# Patient Record
Sex: FEMALE | Race: WHITE | URBAN - NONMETROPOLITAN AREA
[De-identification: names, ages, dates, MRNs, and addresses within clinical notes are randomized per-mention and may not be internally consistent; named-entity substitution may affect disease eponyms.]

---

## 2016-04-25 LAB — NORMAL RANGE: NORMAL

## 2017-05-09 ENCOUNTER — RADIANT APPOINTMENT (OUTPATIENT)
Dept: GENERAL RADIOLOGY | Facility: CLINIC | Age: 52
End: 2017-05-09
Attending: NURSE PRACTITIONER
Payer: OTHER MISCELLANEOUS

## 2017-05-09 ENCOUNTER — OFFICE VISIT (OUTPATIENT)
Dept: FAMILY MEDICINE | Facility: CLINIC | Age: 52
End: 2017-05-09
Payer: OTHER MISCELLANEOUS

## 2017-05-09 VITALS
DIASTOLIC BLOOD PRESSURE: 72 MMHG | RESPIRATION RATE: 18 BRPM | BODY MASS INDEX: 34.37 KG/M2 | HEART RATE: 65 BPM | TEMPERATURE: 97.4 F | HEIGHT: 67 IN | WEIGHT: 219 LBS | OXYGEN SATURATION: 99 % | SYSTOLIC BLOOD PRESSURE: 110 MMHG

## 2017-05-09 DIAGNOSIS — Z02.6 ENCOUNTER RELATED TO WORKER'S COMPENSATION CLAIM: Primary | ICD-10-CM

## 2017-05-09 DIAGNOSIS — Z02.6 ENCOUNTER RELATED TO WORKER'S COMPENSATION CLAIM: ICD-10-CM

## 2017-05-09 DIAGNOSIS — M62.830 SPASM OF BACK MUSCLES: ICD-10-CM

## 2017-05-09 DIAGNOSIS — M25.562 ACUTE PAIN OF LEFT KNEE: ICD-10-CM

## 2017-05-09 DIAGNOSIS — M25.552 HIP PAIN, LEFT: ICD-10-CM

## 2017-05-09 DIAGNOSIS — M54.42 ACUTE LEFT-SIDED LOW BACK PAIN WITH LEFT-SIDED SCIATICA: ICD-10-CM

## 2017-05-09 PROCEDURE — 73502 X-RAY EXAM HIP UNI 2-3 VIEWS: CPT

## 2017-05-09 PROCEDURE — 73560 X-RAY EXAM OF KNEE 1 OR 2: CPT | Mod: LT

## 2017-05-09 PROCEDURE — 72100 X-RAY EXAM L-S SPINE 2/3 VWS: CPT

## 2017-05-09 PROCEDURE — 99214 OFFICE O/P EST MOD 30 MIN: CPT | Performed by: NURSE PRACTITIONER

## 2017-05-09 RX ORDER — IBUPROFEN 800 MG/1
800 TABLET, FILM COATED ORAL EVERY 8 HOURS PRN
Qty: 60 TABLET | Refills: 1 | Status: SHIPPED | OUTPATIENT
Start: 2017-05-09 | End: 2017-06-23

## 2017-05-09 RX ORDER — CYCLOBENZAPRINE HCL 10 MG
5-10 TABLET ORAL 3 TIMES DAILY PRN
Qty: 30 TABLET | Refills: 1 | Status: SHIPPED | OUTPATIENT
Start: 2017-05-09 | End: 2017-06-23

## 2017-05-09 ASSESSMENT — PAIN SCALES - GENERAL: PAINLEVEL: SEVERE PAIN (7)

## 2017-05-09 NOTE — PATIENT INSTRUCTIONS
Use ice for 20 minutes several times a day    Ibuprofen and Tylenol can be rotated every 8 hours.    We will call you with the results of your X rays

## 2017-05-09 NOTE — Clinical Note
Please abstract the following data from this visit with this patient into the appropriate field in Epic:  Lipids found on Care Everywhere [facility] 1/5/2015

## 2017-05-09 NOTE — PROGRESS NOTES
"  SUBJECTIVE:                                                    Courtney Ellison is a 52 year old female who presents to clinic today for the following health issues:      Musculoskeletal problem/pain      Duration: since 4/11/2017    Description  Location: pain on whole left side of body and lower neck but concerned more about knee since previous Total Knee    Intensity:  moderate    Accompanying signs and symptoms: swelling    History  Previous similar problem: no   Previous evaluation:  Total left knee Arthroplasty 10/16/2012 Dr. Waller    Precipitating or alleviating factors:  Trauma or overuse: YES- fall at [employer] 4/11/2017, when trying to sit on stool the stool moved and she landed on floor and shelving when she went down  Aggravating factors include: standing, walking and climbing stairs    Therapies tried and outcome: acetaminophen and NSAID - IBU without any relief     Unable to move left leg to cross over right without pain.   Pain left sciatic radiates down left lateral leg to foot.   Internal and external rotation painful.   Had plate in back - bulging lumbar disc without myelopathy  Lumbar nerve root impingement    12/2015 had  L4-L5 and L5-S1 fuson with screw/gladis fixaton    Problem list and histories reviewed & adjusted, as indicated.  Additional history: as documented      Reviewed and updated as needed this visit by clinical staff  Allergies  Med Hx  Surg Hx  Fam Hx  Soc Hx      Reviewed and updated as needed this visit by Provider         ROS:  Constitutional, HEENT, cardiovascular, pulmonary, GI, , musculoskeletal, neuro, skin, endocrine and psych systems are negative, except as otherwise noted.    OBJECTIVE:                                                    /72 (BP Location: Left arm, Patient Position: Chair, Cuff Size: Adult Large)  Pulse 65  Temp 97.4  F (36.3  C) (Tympanic)  Resp 18  Ht 5' 7\" (1.702 m)  Wt 219 lb (99.3 kg)  SpO2 99%  Breastfeeding? No  BMI 34.3 kg/m2  Body mass " index is 34.3 kg/(m^2).  GENERAL: healthy, alert and no distress  NECK: no adenopathy, no asymmetry, masses, or scars and thyroid normal to palpation  RESP: lungs clear to auscultation - no rales, rhonchi or wheezes  CV: regular rate and rhythm, normal S1 S2, no S3 or S4, no murmur, click or rub, no peripheral edema and peripheral pulses strong  MS: tenderness to palpation lumbar paraspinal region, unable to cross left leg over right without pain and spine exam shows ROM is normal    Diagnostic Test Results:  Results for orders placed or performed in visit on 05/09/17   Colonoscopy - HIM Scan    Impression    Colonoscopy per Care Everywhere [facility] - sigmoid polyp    Mammogram - HIM Scan   Result Value Ref Range    Normal Range      Impression    Mammogram done at [facility] found on Care Everywhere 4/25/2016 negative        ASSESSMENT/PLAN:                                                      1. Encounter related to worker's compensation claim  Injury occurred approximately one month ago, pain has not resolved.  - XR Knee Standing Left 2 Views; Future  - XR Lumbar Spine 2/3 Views; Future  - XR Pelvis w Hip Left 1 View; Future    2. Hip pain, left  - XR Pelvis w Hip Left 1 View; Future  - ibuprofen (ADVIL/MOTRIN) 800 MG tablet; Take 1 tablet (800 mg) by mouth every 8 hours as needed for moderate pain  Dispense: 60 tablet; Refill: 1    3. Acute left-sided low back pain with left-sided sciatica  - XR Lumbar Spine 2/3 Views; Future  - ibuprofen (ADVIL/MOTRIN) 800 MG tablet; Take 1 tablet (800 mg) by mouth every 8 hours as needed for moderate pain  Dispense: 60 tablet; Refill: 1    4. Acute pain of left knee  - XR Knee Standing Left 2 Views; Future    5. Spasm of back muscles  - cyclobenzaprine (FLEXERIL) 10 MG tablet; Take 0.5-1 tablets (5-10 mg) by mouth 3 times daily as needed for muscle spasms  Dispense: 30 tablet; Refill: 1    Patient Instructions   Use ice for 20 minutes several times a day    Ibuprofen and Tylenol can  be rotated every 8 hours.    We will call you with the results of your X rays        Margaret Parrish NP, APRN [facility]

## 2017-05-09 NOTE — NURSING NOTE
"Chief Complaint   Patient presents with     Work Comp     Knee Injury       Initial There were no vitals taken for this visit. Estimated body mass index is 30.55 kg/(m^2) as calculated from the following:    Height as of 5/27/15: 5' 7.5\" (1.715 m).    Weight as of 5/27/15: 198 lb (89.8 kg).  Medication Reconciliation: complete  "

## 2017-05-09 NOTE — MR AVS SNAPSHOT
"              After Visit Summary   5/9/2017    Courtney Ellison    MRN: [id number]           Patient Information     Date Of Birth          1965        Visit Information        Provider Department      5/9/2017 9:20 AM Margaret Parrish APRN CNP [facility]        Today's Diagnoses     Encounter related to worker's compensation claim    -  1    Hip pain, left        Acute left-sided low back pain with left-sided sciatica        Acute pain of left knee        Spasm of back muscles          Care Instructions    Use ice for 20 minutes several times a day    Ibuprofen and Tylenol can be rotated every 8 hours.    We will call you with the results of your X rays            Follow-ups after your visit        Who to contact     If you have questions or need follow up information about today's clinic visit or your schedule please contact [facility] directly at [phone].  Normal or non-critical lab and imaging results will be communicated to you by [employer], letter or phone within 4 business days after the clinic has received the results. If you do not hear from us within 7 days, please contact the clinic through [employer] or phone. If you have a critical or abnormal lab result, we will notify you by phone as soon as possible.  Submit refill requests through [employer] or call your pharmacy and they will forward the refill request to us. Please allow 3 business days for your refill to be completed.          Additional Information About Your Visit        MyChart Information     [employer] lets you send messages to your doctor, view your test results, renew your prescriptions, schedule appointments and more. To sign up, go to [email or web address] . Click on \"Log in\" on the left side of the screen, which will take you to the Welcome page. Then click on \"Sign up Now\" on the right side of the page.     You will be asked to enter the access code listed below, as well as some personal information. " "Please follow the directions to create your username and password.     Your access code is: [email or web address]  Expires: 2017 10:50 AM     Your access code will  in 90 days. If you need help or a new code, please call your [facility] clinic or [phone].        Care EveryWhere ID     This is your Care EveryWhere ID. This could be used by other organizations to access your [facility] medical records  [id number]        Your Vitals Were     Pulse Temperature Respirations Height Pulse Oximetry Breastfeeding?    65 97.4  F (36.3  C) (Tympanic) 18 5' 7\" (1.702 m) 99% No    BMI (Body Mass Index)                   34.3 kg/m2            Blood Pressure from Last 3 Encounters:   17 110/72   05/27/15 99/59    Weight from Last 3 Encounters:   17 219 lb (99.3 kg)   05/27/15 198 lb (89.8 kg)              We Performed the Following     Colonoscopy - HIM Scan     Mammogram - HIM Scan          Today's Medication Changes          These changes are accurate as of: 17 10:51 AM.  If you have any questions, ask your nurse or doctor.               Start taking these medicines.        Dose/Directions    cyclobenzaprine 10 MG tablet   Commonly known as:  FLEXERIL   Used for:  Spasm of back muscles   Started by:  Margaret Parrish APRN CNP        Dose:  5-10 mg   Take 0.5-1 tablets (5-10 mg) by mouth 3 times daily as needed for muscle spasms   Quantity:  30 tablet   Refills:  1         These medicines have changed or have updated prescriptions.        Dose/Directions    * ibuprofen 800 MG tablet   Commonly known as:  ADVIL/MOTRIN   This may have changed:  Another medication with the same name was added. Make sure you understand how and when to take each.   Changed by:  Susanna Buck MD        Refills:  0       * ibuprofen 800 MG tablet   Commonly known as:  ADVIL/MOTRIN   This may have changed:  You were already taking a medication with the same name, and this prescription was added. Make sure you understand how " and when to take each.   Used for:  Hip pain, left, Acute left-sided low back pain with left-sided sciatica   Changed by:  Margaret Parrish APRN CNP        Dose:  800 mg   Take 1 tablet (800 mg) by mouth every 8 hours as needed for moderate pain   Quantity:  60 tablet   Refills:  1       * Notice:  This list has 2 medication(s) that are the same as other medications prescribed for you. Read the directions carefully, and ask your doctor or other care provider to review them with you.         Where to get your medicines      These medications were sent to [id number] - [place], [place] - [address]  [address] [place] [address]     Phone:  [phone]     cyclobenzaprine 10 MG tablet    ibuprofen 800 MG tablet                Primary Care Provider Office Phone # Fax #    Mikayla Yao [phone]       [facility] [address] [place]  [facility] [address]        Thank you!     Thank you for choosing [facility]  for your care. Our goal is always to provide you with excellent care. Hearing back from our patients is one way we can continue to improve our services. Please take a few minutes to complete the written survey that you may receive in the mail after your visit with us. Thank you!             Your Updated Medication List - Protect others around you: Learn how to safely use, store and throw away your medicines at www.disposemymeds.org.          This list is accurate as of: 5/9/17 10:51 AM.  Always use your most recent med list.                   Brand Name Dispense Instructions for use    atenolol 25 MG tablet    TENORMIN         cetirizine 10 MG tablet    zyrTEC         cyclobenzaprine 10 MG tablet    FLEXERIL    30 tablet    Take 0.5-1 tablets (5-10 mg) by mouth 3 times daily as needed for muscle spasms       cycloSPORINE 0.05 % ophthalmic emulsion    RESTASIS    1 Box    Apply 1 drop to eye every 12 hours.       estradiol 2 MG tablet     ESTRACE         FLUoxetine 40 MG capsule    PROzac         * ibuprofen 800 MG tablet    ADVIL/MOTRIN         * ibuprofen 800 MG tablet    ADVIL/MOTRIN    60 tablet    Take 1 tablet (800 mg) by mouth every 8 hours as needed for moderate pain       LOTEMAX 0.5 % ophthalmic susp   Generic drug:  loteprednol          SUMAtriptan 50 MG tablet    IMITREX         * Notice:  This list has 2 medication(s) that are the same as other medications prescribed for you. Read the directions carefully, and ask your doctor or other care provider to review them with you.

## 2017-06-13 ENCOUNTER — TELEPHONE (OUTPATIENT)
Dept: FAMILY MEDICINE | Facility: CLINIC | Age: 52
End: 2017-06-13

## 2017-06-13 NOTE — TELEPHONE ENCOUNTER
Last OV 5-9-17 see note below, or do you want a F/U OV?  Chari Aldana RN    Encounter related to worker's compensation claim  Injury occurred approximately one month ago, pain has not resolved.  - XR Knee Standing Left 2 Views; Future  - XR Lumbar Spine 2/3 Views; Future  - XR Pelvis w Hip Left 1 View; Future     2. Hip pain, left  - XR Pelvis w Hip Left 1 View; Future

## 2017-06-13 NOTE — TELEPHONE ENCOUNTER
Reason for call:  Patient reporting a symptom    Symptom or request: Left Hip/Leg/Knee pain. Saw TASH Parrish discussed Cortisone Injection if not any better. Pt was to call in if wants Injection    Duration (how long have symptoms been present): Pt fell April 11 see OV notes.     Have you been treated for this before? Yes    Phone Number patient can be reached at:  Home number on file [phone] (home)    Best Time:  Any Time      Can we leave a detailed message on this number:  YES    Call taken on 6/13/2017 at 11:16 AM by Dede Bryant

## 2017-06-14 NOTE — TELEPHONE ENCOUNTER
It's been over a month since visit. She should be seen to discuss injections. yosi Parrish RNC, NP  [facility]

## 2017-06-23 ENCOUNTER — OFFICE VISIT (OUTPATIENT)
Dept: FAMILY MEDICINE | Facility: CLINIC | Age: 52
End: 2017-06-23
Payer: OTHER MISCELLANEOUS

## 2017-06-23 VITALS
BODY MASS INDEX: 33.2 KG/M2 | DIASTOLIC BLOOD PRESSURE: 60 MMHG | HEART RATE: 60 BPM | TEMPERATURE: 97 F | WEIGHT: 212 LBS | SYSTOLIC BLOOD PRESSURE: 120 MMHG

## 2017-06-23 DIAGNOSIS — M25.552 HIP PAIN, LEFT: ICD-10-CM

## 2017-06-23 DIAGNOSIS — M70.62 TROCHANTERIC BURSITIS OF LEFT HIP: ICD-10-CM

## 2017-06-23 DIAGNOSIS — M62.830 SPASM OF BACK MUSCLES: Primary | ICD-10-CM

## 2017-06-23 DIAGNOSIS — M54.42 ACUTE LEFT-SIDED LOW BACK PAIN WITH LEFT-SIDED SCIATICA: ICD-10-CM

## 2017-06-23 PROCEDURE — 20610 DRAIN/INJ JOINT/BURSA W/O US: CPT | Mod: LT | Performed by: NURSE PRACTITIONER

## 2017-06-23 PROCEDURE — 99214 OFFICE O/P EST MOD 30 MIN: CPT | Mod: 25 | Performed by: NURSE PRACTITIONER

## 2017-06-23 RX ORDER — CETIRIZINE HYDROCHLORIDE 10 MG/1
TABLET ORAL
Qty: 30 TABLET | Refills: 2 | COMMUNITY
Start: 2017-06-23

## 2017-06-23 RX ORDER — CYCLOBENZAPRINE HCL 10 MG
5-10 TABLET ORAL 3 TIMES DAILY PRN
Qty: 30 TABLET | Refills: 1 | Status: SHIPPED | OUTPATIENT
Start: 2017-06-23

## 2017-06-23 RX ORDER — TRIAMCINOLONE ACETONIDE 40 MG/ML
40 INJECTION, SUSPENSION INTRA-ARTICULAR; INTRAMUSCULAR ONCE
Qty: 1 ML | Refills: 0 | OUTPATIENT
Start: 2017-06-23 | End: 2017-06-23

## 2017-06-23 RX ORDER — LIDOCAINE HYDROCHLORIDE 10 MG/ML
4 INJECTION, SOLUTION INFILTRATION; PERINEURAL ONCE
Qty: 4 ML | Refills: 0 | OUTPATIENT
Start: 2017-06-23 | End: 2017-06-23

## 2017-06-23 RX ORDER — IBUPROFEN 800 MG/1
800 TABLET, FILM COATED ORAL EVERY 8 HOURS PRN
Qty: 60 TABLET | Refills: 1 | Status: SHIPPED | OUTPATIENT
Start: 2017-06-23

## 2017-06-23 RX ORDER — ATENOLOL 25 MG/1
TABLET ORAL
Qty: 30 TABLET | COMMUNITY
Start: 2017-06-23

## 2017-06-23 RX ORDER — FLUOXETINE 40 MG/1
CAPSULE ORAL
Qty: 30 CAPSULE | Refills: 2 | COMMUNITY
Start: 2017-06-23

## 2017-06-23 RX ORDER — ESTRADIOL 2 MG/1
TABLET ORAL
COMMUNITY
Start: 2017-06-23

## 2017-06-23 NOTE — PROGRESS NOTES
SUBJECTIVE:                                                    Courtney Ellison is a 52 year old female who presents to clinic today for the following health issues:      Musculoskeletal problem/pain      Duration: aprils     Description  Location: left hip pain     Intensity:  moderate    Accompanying signs and symptoms: radiation of pain to lower leg     History  Previous similar problem: no   Previous evaluation:  x-ray  Results for orders placed or performed in visit on 05/09/17   XR Pelvis w Hip Left 1 View    Narrative    XR PELVIS AND HIP LEFT 1 VIEW 5/9/2017 10:30 AM    COMPARISON: None.    HISTORY: Fall one month ago, now with increased pain.      Impression    IMPRESSION: Partially imaged lumbosacral fusion hardware. No fractures  are seen. Hip joint spaces are preserved. Pelvic enthesopathy is seen.    KENNETH RAVI         Precipitating or alleviating factors:  Trauma or overuse: YES- fall at [employer] 4/11/2017, when trying to sit on stool the stool moved and she landed on floor and shelving when she went down  Aggravating factors include: standing, walking and climbing stairs      Unable to move left leg to cross over right without pain.   Pain left sciatic radiates down left lateral leg to foot.   Internal and external rotation painful.        Not able to sleep on this side.   Muscle spasm and cramping.worse at night.   No history of diabetes.     PMH   Left knee TKA 2014  Pin right ankle [employer]   Fusion L4-L5 2015 - Dr Robbins Ortho Surgeon [facility] section x 2 1991, 1998  No other surgeries            -------------------------------------    Problem list and histories reviewed & adjusted, as indicated.  Additional history: as documented    Labs reviewed in EPIC    Reviewed and updated as needed this visit by clinical staff  Allergies       Reviewed and updated as needed this visit by Provider         ROS:   ROS: 10 point ROS neg other than the symptoms noted above in the HPI.      OBJECTIVE:                                                     /60  Pulse 60  Temp 97  F (36.1  C) (Tympanic)  Wt 212 lb (96.2 kg)  BMI 33.2 kg/m2  Body mass index is 33.2 kg/(m^2).   GENERAL: healthy, alert, well nourished, well hydrated, no distress  HENT: ear canals- normal; TMs- normal; Nose- normal; Mouth- no ulcers, no lesions  NECK: no tenderness, no adenopathy, no asymmetry, no masses, no stiffness; thyroid- normal to palpation  RESP: lungs clear to auscultation - no rales, no rhonchi, no wheezes  CV: regular rates and rhythm, normal S1 S2, no S3 or S4 and no murmur, no click or rub -  ABDOMEN: soft, no tenderness, no  hepatosplenomegaly, no masses, normal bowel sounds  MS: extremities- no gross deformities noted, no edema pain right trochanter bursa with palpation. Spasming into the left SI joint with trigger point tenderness reflexes to the lower extremities and sensation are intact    Diagnostic test results:  Results for orders placed or performed in visit on 05/09/17   XR Pelvis w Hip Left 1 View    Narrative    XR PELVIS AND HIP LEFT 1 VIEW 5/9/2017 10:30 AM    COMPARISON: None.    HISTORY: Fall one month ago, now with increased pain.      Impression    IMPRESSION: Partially imaged lumbosacral fusion hardware. No fractures  are seen. Hip joint spaces are preserved. Pelvic enthesopathy is seen.    KENNETH RAVI        ASSESSMENT/PLAN:                                                    1. Spasm of back muscles  Symptomatic care strategies are reviewed. May use oral anti-spasmodic    - cyclobenzaprine (FLEXERIL) 10 MG tablet; Take 0.5-1 tablets (5-10 mg) by mouth 3 times daily as needed for muscle spasms  Dispense: 30 tablet; Refill: 1    2. Hip pain, left  - ibuprofen (ADVIL/MOTRIN) 800 MG tablet; Take 1 tablet (800 mg) by mouth every 8 hours as needed for moderate pain  Dispense: 60 tablet; Refill: 1    3. Acute left-sided low back pain with left-sided sciatica  - ibuprofen (ADVIL/MOTRIN) 800 MG tablet; Take 1  tablet (800 mg) by mouth every 8 hours as needed for moderate pain  Dispense: 60 tablet; Refill: 1  Home physical therapy stretching and yoga encouraged    4. Trochanteric bursitis of left hip  After verbal consent was obtained and side effects reviewed the left trochanter bursa was injected using sterile technique with 40 mg of Kenalog and 1% lidocaine  Patient tolerated this well. Bacitracin and Band-Aid were applied  - Kenalog 40 MG  []  - Lidocaine 1% or 2%     []  - triamcinolone acetonide (KENALOG) 40 MG/ML injection; 1 mL (40 mg) by INTRA-ARTICULAR route once for 1 dose  Dispense: 1 mL; Refill: 0  - lidocaine 1 % injection; 4 mLs by INTRA-ARTICULAR route once for 1 dose  Dispense: 4 mL; Refill: 0      Follow up with Provider - Call or return to the clinic with any worsening of symptoms or no resolution. Patient/Parent verbalized understanding and is in agreement. Medication side effects reviewed.   Current Outpatient Prescriptions   Medication Sig Dispense Refill     atenolol (TENORMIN) 25 MG tablet  30 tablet      estradiol (ESTRACE) 2 MG tablet        FLUoxetine (PROZAC) 40 MG capsule  30 capsule 2     cetirizine (ZYRTEC) 10 MG tablet  30 tablet 2     cyclobenzaprine (FLEXERIL) 10 MG tablet Take 0.5-1 tablets (5-10 mg) by mouth 3 times daily as needed for muscle spasms 30 tablet 1     ibuprofen (ADVIL/MOTRIN) 800 MG tablet Take 1 tablet (800 mg) by mouth every 8 hours as needed for moderate pain 60 tablet 1     LOTEMAX 0.5 % ophthalmic suspension   5     ibuprofen (ADVIL,MOTRIN) 800 MG tablet   0     sumatriptan (IMITREX) 50 MG tablet        cycloSPORINE (RESTASIS) 0.05 % ophthalmic emulsion Apply 1 drop to eye every 12 hours. 1 Box 1        See Patient Instructions    [facility]

## 2017-06-23 NOTE — MR AVS SNAPSHOT
After Visit Summary   6/23/2017    Courtney Ellison    MRN: [id number]           Patient Information     Date Of Birth          1965        Visit Information        Provider Department      6/23/2017 8:40 AM Lisseth Ramirez APRN [facility]        Today's Diagnoses     Spasm of back muscles        Hip pain, left        Acute left-sided low back pain with left-sided sciatica          Care Instructions      Hip Strain    You have a strain of the muscles around the hip joint. A muscle strain is a stretching or tearing of muscle fibers. This causes pain, especially when you move that muscle. There may also be some swelling and bruising.  Home care    Stay off the injured leg as much as possible until you can walk on it without pain. If you have a lot of pain with walking, crutches or a walker may be prescribed. These can be rented or purchased at many pharmacies and surgical or orthopedic supply stores. Follow your healthcare provider's advice regarding when to begin putting weight on that leg.    Apply an ice pack over the injured area for 15 to 20 minutes every 3 to 6 hours. You should do this for the first 24 to 48 hours. You can make an ice pack by filling a plastic bag that seals at the top with ice cubes and then wrapping it with a thin towel. Be careful not to injure your skin with the ice treatments. Ice should never be applied directly to skin. Continue the use of ice packs for relief of pain and swelling as needed. After 48 hours, apply heat (warm shower or warm bath) for 15 to 20 minutes several times a day, or alternate ice and heat.    You may use over-the-counter pain medicine to control pain, unless another pain medicine was prescribed. If you have chronic liver or kidney disease or ever had a stomach ulcer or GI bleeding, talk with your healthcare provider beforeusing these medicines.    If you play sports, you may resume these activities when you are able to hop  and run on the injured leg without pain.  Follow-up care  Follow up with your healthcare provider, or as advised. If your symptoms do not begin to get better after a week, more tests may be needed.  If X-rays were taken, you will be told of any new findings that may affect your care.  When to seek medical advice  Call your healthcare provider right away if any of these occur:    Increased swelling or bruising    Increased pain    Losing the ability to put weight on the injured side  Date Last Reviewed: 11/19/2015 2000-2017 [employer]. [address] [place], [place] [address]. All rights reserved. This information is not intended as a substitute for professional medical care. Always follow your healthcare professional's instructions.        How Your Hip Works  The hip joint is one of the body s largest weight-bearing joints. It s a ball-and-socket joint. This helps the hip remain stable even during twisting and extreme ranges of motion. A healthy hip joint allows you to walk, squat, and turn without pain.     Side view of the right hip   A healthy hip  The hip joint is formed where the rounded head of the thighbone (femur) joins the pelvic bone. The joint is covered with tissue and powered by large muscles. When all of the parts listed below are healthy, a hip should move easily.     Front view of the right hip     Cartilage is a layer of smooth tissue. It covers the ball of the thighbone, and lines the socket of the pelvic bone. Healthy cartilage absorbs stress and allows the ball to glide easily in the socket.    Muscles power the hip and leg for movement.    Tendons attach the muscles to the bones.  Date Last Reviewed: 8/28/2015 2000-2017 [employer]. [address] [place] [address]. All rights reserved. This information is not intended as a substitute for professional medical care. Always follow your healthcare professional's instructions.                Follow-ups  "after your visit        Who to contact     If you have questions or need follow up information about today's clinic visit or your schedule please contact [facility] directly at [phone].  Normal or non-critical lab and imaging results will be communicated to you by MyChart, letter or phone within 4 business days after the clinic has received the results. If you do not hear from us within 7 days, please contact the clinic through MyChart or phone. If you have a critical or abnormal lab result, we will notify you by phone as soon as possible.  Submit refill requests through [facility] or call your pharmacy and they will forward the refill request to us. Please allow 3 business days for your refill to be completed.          Additional Information About Your Visit        [employer] Information     [facility] lets you send messages to your doctor, view your test results, renew your prescriptions, schedule appointments and more. To sign up, go to [email or web address] . Click on \"Log in\" on the left side of the screen, which will take you to the Welcome page. Then click on \"Sign up Now\" on the right side of the page.     You will be asked to enter the access code listed below, as well as some personal information. Please follow the directions to create your username and password.     Your access code is: [email or web address]  Expires: 2017 10:50 AM     Your access code will  in 90 days. If you need help or a new code, please call your [place] clinic or [phone].        Care EveryWhere ID     This is your Care EveryWhere ID. This could be used by other organizations to access your [place] medical records  [id number]        Your Vitals Were     Pulse Temperature BMI (Body Mass Index)             60 97  F (36.1  C) (Tympanic) 33.2 kg/m2          Blood Pressure from Last 3 Encounters:   17 120/60   17 110/72   05/27/15 99/59    Weight from Last 3 Encounters:   17 212 lb (96.2 kg) "   05/09/17 219 lb (99.3 kg)   05/27/15 198 lb (89.8 kg)              Today, you had the following     No orders found for display         Where to get your medicines      These medications were sent to [id number] - [place], [place] - [address] [facility]  [address] [facility] [address]     Phone:  [phone]     cyclobenzaprine 10 MG tablet    ibuprofen 800 MG tablet          Primary Care Provider Office Phone # Fax #    April M Maximilian [phone]       [facility] [address] [place]  [facility] [address]        Equal Access to Services     [facility]: Hadii naa ku hadasho Soomaali, waaxda luqadaha, qaybta kaalmada adeegyada, rodrigue chen . So [facility] [phone].    ATENCIÓN: Si habla español, tiene a escobar disposición servicios gratuitos de asistencia lingüística. [facility] [phone].    We comply with applicable federal civil rights laws and [place] laws. We do not discriminate on the basis of race, color, national origin, age, disability sex, sexual orientation or gender identity.            Thank you!     Thank you for choosing [facility]  for your care. Our goal is always to provide you with excellent care. Hearing back from our patients is one way we can continue to improve our services. Please take a few minutes to complete the written survey that you may receive in the mail after your visit with us. Thank you!             Your Updated Medication List - Protect others around you: Learn how to safely use, store and throw away your medicines at www.disposemymeds.org.          This list is accurate as of: 6/23/17  9:44 AM.  Always use your most recent med list.                   Brand Name Dispense Instructions for use Diagnosis    atenolol 25 MG tablet    TENORMIN    30 tablet         cetirizine 10 MG tablet    zyrTEC    30 tablet         cyclobenzaprine 10 MG tablet    FLEXERIL    30 tablet    Take 0.5-1 tablets  (5-10 mg) by mouth 3 times daily as needed for muscle spasms    Spasm of back muscles       cycloSPORINE 0.05 % ophthalmic emulsion    RESTASIS    1 Box    Apply 1 drop to eye every 12 hours.    Keratoconjunctivitis sicca, not specified as Sjogren's       estradiol 2 MG tablet    ESTRACE          FLUoxetine 40 MG capsule    PROzac    30 capsule         * ibuprofen 800 MG tablet    ADVIL/MOTRIN          * ibuprofen 800 MG tablet    ADVIL/MOTRIN    60 tablet    Take 1 tablet (800 mg) by mouth every 8 hours as needed for moderate pain    Hip pain, left, Acute left-sided low back pain with left-sided sciatica       LOTEMAX 0.5 % ophthalmic susp   Generic drug:  loteprednol           SUMAtriptan 50 MG tablet    IMITREX          * Notice:  This list has 2 medication(s) that are the same as other medications prescribed for you. Read the directions carefully, and ask your doctor or other care provider to review them with you.

## 2017-06-23 NOTE — PATIENT INSTRUCTIONS
Hip Strain    You have a strain of the muscles around the hip joint. A muscle strain is a stretching or tearing of muscle fibers. This causes pain, especially when you move that muscle. There may also be some swelling and bruising.  Home care    Stay off the injured leg as much as possible until you can walk on it without pain. If you have a lot of pain with walking, crutches or a walker may be prescribed. These can be rented or purchased at many pharmacies and surgical or orthopedic supply stores. Follow your healthcare provider's advice regarding when to begin putting weight on that leg.    Apply an ice pack over the injured area for 15 to 20 minutes every 3 to 6 hours. You should do this for the first 24 to 48 hours. You can make an ice pack by filling a plastic bag that seals at the top with ice cubes and then wrapping it with a thin towel. Be careful not to injure your skin with the ice treatments. Ice should never be applied directly to skin. Continue the use of ice packs for relief of pain and swelling as needed. After 48 hours, apply heat (warm shower or warm bath) for 15 to 20 minutes several times a day, or alternate ice and heat.    You may use over-the-counter pain medicine to control pain, unless another pain medicine was prescribed. If you have chronic liver or kidney disease or ever had a stomach ulcer or GI bleeding, talk with your healthcare provider beforeusing these medicines.    If you play sports, you may resume these activities when you are able to hop and run on the injured leg without pain.  Follow-up care  Follow up with your healthcare provider, or as advised. If your symptoms do not begin to get better after a week, more tests may be needed.  If X-rays were taken, you will be told of any new findings that may affect your care.  When to seek medical advice  Call your healthcare provider right away if any of these occur:    Increased swelling or bruising    Increased pain    Losing the  ability to put weight on the injured side  Date Last Reviewed: 11/19/2015 2000-2017 [employer]. [address] [place] [address]. All rights reserved. This information is not intended as a substitute for professional medical care. Always follow your healthcare professional's instructions.        How Your Hip Works  The hip joint is one of the body s largest weight-bearing joints. It s a ball-and-socket joint. This helps the hip remain stable even during twisting and extreme ranges of motion. A healthy hip joint allows you to walk, squat, and turn without pain.     Side view of the right hip   A healthy hip  The hip joint is formed where the rounded head of the thighbone (femur) joins the pelvic bone. The joint is covered with tissue and powered by large muscles. When all of the parts listed below are healthy, a hip should move easily.     Front view of the right hip     Cartilage is a layer of smooth tissue. It covers the ball of the thighbone, and lines the socket of the pelvic bone. Healthy cartilage absorbs stress and allows the ball to glide easily in the socket.    Muscles power the hip and leg for movement.    Tendons attach the muscles to the bones.  Date Last Reviewed: 8/28/2015 2000-2017 [employer] [address] [place] [address]. All rights reserved. This information is not intended as a substitute for professional medical care. Always follow your healthcare professional's instructions.